# Patient Record
(demographics unavailable — no encounter records)

---

## 2024-10-30 NOTE — CARDIOLOGY SUMMARY
[de-identified] : 10/30/2024: NSR, normal axis, normal intervals, (-) ST-T wave changes. =======================================================

## 2024-10-30 NOTE — ASSESSMENT
[FreeTextEntry1] : EKG performed today was unremarkable.  Dizziness: Likely secondary to orthostatic hypotension. Recommend increasing salt and water intake. No exertional symptoms. No further cardiac intervention warranted at the current time.  Plan was discussed with the patient.

## 2024-10-30 NOTE — CARDIOLOGY SUMMARY
[de-identified] : 10/30/2024: NSR, normal axis, normal intervals, (-) ST-T wave changes. =======================================================

## 2024-10-30 NOTE — HISTORY OF PRESENT ILLNESS
[FreeTextEntry1] : JOHANNY INIGUEZ is a 36-year-old female, with a PMHx significant for hypothyroidism, who was referred by her PCP (Dr. Vee Haley) for evaluation of lightheadedness. Has positional changes when going from a sitting to standing position. BP runs in the high 90s. Of note, patient is an active  classical dancer. Denies chest pain or SOB with activity.   Social History: (-) Smoking, (-) Illicit drug use.